# Patient Record
Sex: FEMALE | Race: WHITE | Employment: OTHER | URBAN - METROPOLITAN AREA
[De-identification: names, ages, dates, MRNs, and addresses within clinical notes are randomized per-mention and may not be internally consistent; named-entity substitution may affect disease eponyms.]

---

## 2018-11-23 ENCOUNTER — APPOINTMENT (OUTPATIENT)
Dept: GENERAL RADIOLOGY | Age: 83
End: 2018-11-23
Payer: MEDICARE

## 2018-11-23 ENCOUNTER — HOSPITAL ENCOUNTER (EMERGENCY)
Age: 83
Discharge: HOME | End: 2018-11-23
Attending: EMERGENCY MEDICINE
Payer: MEDICARE

## 2018-11-23 VITALS
WEIGHT: 135 LBS | DIASTOLIC BLOOD PRESSURE: 54 MMHG | RESPIRATION RATE: 16 BRPM | HEART RATE: 89 BPM | TEMPERATURE: 98.2 F | OXYGEN SATURATION: 97 % | SYSTOLIC BLOOD PRESSURE: 129 MMHG

## 2018-11-23 DIAGNOSIS — S61.412A LACERATION OF LEFT HAND WITHOUT FOREIGN BODY, INITIAL ENCOUNTER: Primary | ICD-10-CM

## 2018-11-23 PROCEDURE — 4500000023 HC ED LEVEL 3 PROCEDURE

## 2018-11-23 PROCEDURE — 73130 X-RAY EXAM OF HAND: CPT

## 2018-11-23 PROCEDURE — 99283 EMERGENCY DEPT VISIT LOW MDM: CPT

## 2018-11-23 RX ORDER — TRAMADOL HYDROCHLORIDE 50 MG/1
50 TABLET ORAL EVERY 6 HOURS PRN
COMMUNITY

## 2018-11-23 RX ORDER — LISINOPRIL 5 MG/1
5 TABLET ORAL DAILY
COMMUNITY

## 2018-11-23 NOTE — ED NOTES
Discussed dermabond care in detail with patient. Pt assisted into w/c with help of 3 staff members due to difficult transfer. Assisted into car with help of RN, RT, and her son using gait belt and walker. Pt able to stand easier for transfer from w/c to car. Alert and oriented at discharge.      Kelli Hartman RN  11/23/18 [id number]

## 2018-11-23 NOTE — ED PROVIDER NOTES
extremities with normal ROM grossly. No obvious bony deformities. SKIN: Warm/dry. No rashes patient has a 1 cm or less laceration of the left hand just above the level of the web same. No active bleeding currently. I am not able to separate the edges of the wound. It is not actively be radiating. Range of motion of the fingers are intact sensation is intact distally. Cap refill less than 2 seconds. PSYCHIATRIC: Patient is alert and oriented with normal affect  NEUROLOGIC: Cranial nerves grossly intact. Moves all extremities with equal strength. No gross sensory deficits. Answers questions/follows commands appropriately. ED COURSE/MDM  Nursing notes reviewed. Pt was given the following medications or treatments in the ED: Patient has a very small laceration of the left hand that has some ecchymosis over same. Her skin is thin so I can see where she would have bleeding. The wound was cleansed and x-ray was obtained and showed no evidence of any bony abnormality or involvement. Therefore no antibiotics will be needed at this time. The laceration will be glued with Dermabond. She will be discharged in satisfactory condition. Clinical Impression  Based on the presenting complaint, history, and physical exam, multiple diagnoses were considered. Exam and workup here most c/w:  1. Laceration of left hand without foreign body, initial encounter        I discussed with Jina Gibbons the results of evaluation in the ED, diagnosis, care, and prognosis. The plan is to discharge to home. Patient is in agreement with plan and questions have been answered. I also discussed with Jina Gibbons the reasons which may require a return visit and the importance of follow-up care. The patient is well-appearing, nontoxic, and improved at the time of discharge. Patient agrees to call to arrange follow-up care as directed.    Jina Gibbons understands to return immediately for worsening/change